# Patient Record
Sex: FEMALE | Race: BLACK OR AFRICAN AMERICAN | NOT HISPANIC OR LATINO | URBAN - METROPOLITAN AREA
[De-identification: names, ages, dates, MRNs, and addresses within clinical notes are randomized per-mention and may not be internally consistent; named-entity substitution may affect disease eponyms.]

---

## 2024-06-21 ENCOUNTER — OFFICE VISIT (OUTPATIENT)
Dept: URBAN - METROPOLITAN AREA CLINIC 84 | Facility: CLINIC | Age: 29
End: 2024-06-21
Payer: COMMERCIAL

## 2024-06-21 ENCOUNTER — DASHBOARD ENCOUNTERS (OUTPATIENT)
Age: 29
End: 2024-06-21

## 2024-06-21 ENCOUNTER — TELEPHONE ENCOUNTER (OUTPATIENT)
Dept: URBAN - METROPOLITAN AREA CLINIC 84 | Facility: CLINIC | Age: 29
End: 2024-06-21

## 2024-06-21 VITALS
DIASTOLIC BLOOD PRESSURE: 74 MMHG | HEIGHT: 60 IN | HEART RATE: 83 BPM | TEMPERATURE: 97.3 F | SYSTOLIC BLOOD PRESSURE: 108 MMHG

## 2024-06-21 DIAGNOSIS — K21.9 GERD: ICD-10-CM

## 2024-06-21 DIAGNOSIS — R11.2 NAUSEA AND VOMITING: ICD-10-CM

## 2024-06-21 DIAGNOSIS — R05.3 CHRONIC COUGH: ICD-10-CM

## 2024-06-21 PROCEDURE — 99204 OFFICE O/P NEW MOD 45 MIN: CPT | Performed by: INTERNAL MEDICINE

## 2024-06-21 RX ORDER — LEVOCETIRIZINE DIHYDROCHLORIDE 5 MG/1
TAKE TWO TABLETS BY MOUTH ONE TIME DAILY TABLET, FILM COATED ORAL
Qty: 30 UNSPECIFIED | Refills: 0 | Status: ACTIVE | COMMUNITY

## 2024-06-21 RX ORDER — LISINOPRIL 5 MG/1
TAKE ONE TABLET BY MOUTH ONE TIME DAILY TABLET ORAL
Qty: 30 UNSPECIFIED | Refills: 1 | Status: ACTIVE | COMMUNITY

## 2024-06-21 RX ORDER — AMLODIPINE 1 MG/ML
TAKE 10ML BY MOUTH ONCE DAILY SUSPENSION ORAL
Qty: 300 UNSPECIFIED | Refills: 0 | Status: ACTIVE | COMMUNITY

## 2024-06-21 RX ORDER — MEDROXYPROGESTERONE ACETATE 150 MG/ML
INJECT 1ML INTRAMUSCULARLY EVERY 3 MONTHS INJECTION, SUSPENSION INTRAMUSCULAR
Qty: 1 UNSPECIFIED | Refills: 0 | Status: ACTIVE | COMMUNITY

## 2024-06-21 RX ORDER — GLYCOPYRROLATE 1 MG/1
TAKE ONE TABLET BY MOUTH TWICE A DAY TABLET ORAL
Qty: 60 UNSPECIFIED | Refills: 0 | Status: ACTIVE | COMMUNITY

## 2024-06-21 RX ORDER — CETIRIZINE HYDROCHLORIDE 10 MG/1
TAKE ONE TABLET BY MOUTH ONE TIME DAILY AS NEEDED TABLET ORAL
Qty: 30 UNSPECIFIED | Refills: 2 | Status: ACTIVE | COMMUNITY

## 2024-06-21 RX ORDER — ALBUTEROL SULFATE 2.5 MG/3ML
INHALE ONE VIAL VIA NEBULIZER EVERY 6 HOURS AS NEEDED FOR WHEEZING OR FOR SHORTNESS OF BREATH SOLUTION RESPIRATORY (INHALATION)
Qty: 180 UNSPECIFIED | Refills: 0 | Status: ACTIVE | COMMUNITY

## 2024-06-21 NOTE — HPI-TODAY'S VISIT:
This patient was referred by Dr. Rosita Loaiza for an evaluation of cough and vomiting.  A copy of this will be sent to the referring provider.  Lore has CP and is wheelchair bound.  Since 9/2023 she has had chronic cough.  She has intermittent post tussive emesis.  Over the past month she has had increased vomiting independant of the cough.  She has retching followed by vomiting.  She vomits mucus and saliva with occasional food.  The cough has actually improved.  She was een by Pulmonary last month and she was started on allergy medications and nebs.  There is no abdominal pain.  She is eating well.  There are no supine symptoms.  She uses Thick-it in her liquids.  She no longer has a G-tube.  She uses Miralax and she moves her bowels with this.  There is no LGI bleed or melena.  She has not been on PPI therapy.  She has not had prior EGD.  She was at the ED in 12/2023. Hospital records were reviewed in clinic today including Attending notes, imaging reports, BMP/CBC/LFT's.  All results were normal.  She had her first PEG when she was 2 years old.  Her mother thinks tht she was told that it's prolonged placement may have caused a lot of scar tissue